# Patient Record
Sex: FEMALE | Race: WHITE | URBAN - NONMETROPOLITAN AREA
[De-identification: names, ages, dates, MRNs, and addresses within clinical notes are randomized per-mention and may not be internally consistent; named-entity substitution may affect disease eponyms.]

---

## 2023-01-10 ENCOUNTER — OFFICE VISIT (OUTPATIENT)
Dept: OBGYN CLINIC | Age: 68
End: 2023-01-10
Payer: MEDICARE

## 2023-01-10 VITALS
DIASTOLIC BLOOD PRESSURE: 88 MMHG | HEIGHT: 65 IN | WEIGHT: 140.2 LBS | BODY MASS INDEX: 23.36 KG/M2 | SYSTOLIC BLOOD PRESSURE: 130 MMHG

## 2023-01-10 DIAGNOSIS — Z78.0 ASYMPTOMATIC MENOPAUSAL STATE: ICD-10-CM

## 2023-01-10 DIAGNOSIS — Z01.419 VISIT FOR GYNECOLOGIC EXAMINATION: Primary | ICD-10-CM

## 2023-01-10 PROCEDURE — G0101 CA SCREEN;PELVIC/BREAST EXAM: HCPCS

## 2023-01-10 ASSESSMENT — ENCOUNTER SYMPTOMS
CONSTIPATION: 0
SHORTNESS OF BREATH: 0
ABDOMINAL PAIN: 0
DIARRHEA: 0

## 2023-01-10 NOTE — PROGRESS NOTES
YEARLY PHYSICAL    Date of service: 1/10/2023    Liv Bertrand  Is a 79 y.o.   female    PT's PCP is: Tristin Figueroa MD     : 1955                                         Chaperone for Intimate Exam  Chaperone was offered as part of the rooming process. Patient declined and agrees to continue with exam without a chaperone. Subjective:       No LMP recorded. Patient has had a hysterectomy. Are your menses regular: not applicable    OB History    Para Term  AB Living   2 2 1 1   1   SAB IAB Ectopic Molar Multiple Live Births             2      # Outcome Date GA Lbr Roman/2nd Weight Sex Delivery Anes PTL Lv   2  86 30w0d   F Vag-Spont   DEC   1 Term 82    M Vag-Spont   PRIMITIVO        Social History     Tobacco Use   Smoking Status Never   Smokeless Tobacco Never        Social History     Substance and Sexual Activity   Alcohol Use Not Currently    Comment: occasional wine       Family History   Problem Relation Age of Onset    Diabetes Maternal Grandmother     Stroke Father     Arthritis Father     Diabetes Father     Heart Attack Father     Rheum Arthritis Father     Breast Cancer Paternal Aunt        Any family history of breast or ovarian cancer: Yes    Any family history of blood clots: No      Allergies: Iodine      Current Outpatient Medications:     ONE TOUCH ULTRA TEST strip,   , Disp: , Rfl: 1    ibuprofen (ADVIL;MOTRIN) 800 MG tablet, Take 800 mg by mouth every 6 hours as needed for Pain., Disp: , Rfl:     Calcium Carb-Ergocalciferol (CHEWABLE CALCIUM/D PO), Take 1 tablet by mouth., Disp: , Rfl:     Omega-3 Fatty Acids (FISH OIL) 1200 MG CAPS, Take  by mouth., Disp: , Rfl:     Docosahexaenoic Acid 200 MG CAPS, Take  by mouth., Disp: , Rfl:     aspirin 81 MG chewable tablet, Take 81 mg by mouth daily. , Disp: , Rfl:     Loratadine-Pseudoephedrine (CLARITIN-D 12 HOUR PO), Take 1 tablet by mouth 2 times daily. Prn, Disp: , Rfl:     Social History     Substance and Sexual Activity   Sexual Activity Not Currently       Any bleeding or pain with intercourse: not sexually active    Last Yearly:  11/8/18    Last pap: not sure    Last HPV: not sure    Last Mammogram: 1/2023    Last Dexascan 2017    Last colorectal screen- type:colonoscopy*  date  2016    Do you do self breast exams: Yes    Past Medical History:   Diagnosis Date    Diverticulitis     Hyperlipidemia     Sarcoidosis of lymph nodes ([facility] [address] 01/01/1996    Stress incontinence 07/15/2015       Past Surgical History:   Procedure Laterality Date    BLADDER REPAIR      FINGER TRIGGER RELEASE Bilateral     HYSTERECTOMY (CERVIX STATUS UNKNOWN)         Family History   Problem Relation Age of Onset    Diabetes Maternal Grandmother     Stroke Father     Arthritis Father     Diabetes Father     Heart Attack Father     Rheum Arthritis Father     Breast Cancer Paternal Aunt        Chief Complaint   Patient presents with    Annual Exam     Here for annual exam. Last annual 11/8/18, history of hysterectomy. Would like discuss recent mammogram.          PE:  Vital Signs  Blood pressure 130/88, height 5' 5\" (1.651 m), weight 140 lb 3.2 oz (63.6 kg). Estimated body mass index is 23.33 kg/m² as calculated from the following:    Height as of this encounter: 5' 5\" (1.651 m). Weight as of this encounter: 140 lb 3.2 oz (63.6 kg). Labs:    No results found for this visit on 01/10/23. No data recorded    NURSE: YOANDY edge RN    HPI: Patient presents to establish care for annual visit. Denies breast concerns. Encouraged SBE. UTD on mammogram - reviewed normal results. Did have DEXA scan about 6 years ago - would like to repeat to make sure BMD remains normal.  She is currently taking vitamin D/calcium. Denies bowel/bladder concerns. Reports that vaginal repair that she had several years ago worked great for her. Denies abnormal discharge, pelvic pain.   Denies bothersome symptoms of menopause. She had TLH at ~37 years old. Review of Systems   Constitutional:  Negative for chills, fatigue and fever. Respiratory:  Negative for shortness of breath. Cardiovascular:  Negative for chest pain. Gastrointestinal:  Negative for abdominal pain, constipation and diarrhea. Genitourinary:  Negative for dyspareunia, dysuria, frequency, menstrual problem, pelvic pain, urgency, vaginal bleeding and vaginal discharge. Neurological:  Negative for dizziness, light-headedness and headaches. Physical Exam  Constitutional:       Appearance: Normal appearance. Genitourinary:      Vulva normal.      Right Labia: No rash, tenderness or lesions. Left Labia: No tenderness, lesions or rash. No vaginal discharge, tenderness or bleeding. Right Adnexa: not tender and not full. Left Adnexa: not tender and not full. Cervix is absent. Uterus is absent. Pelvic exam was performed with patient in the lithotomy position. Breasts:     Breasts are symmetrical.      Right: No inverted nipple, nipple discharge, skin change or tenderness. Left: No inverted nipple, nipple discharge, skin change or tenderness. HENT:      Head: Normocephalic and atraumatic. Mouth/Throat:      Mouth: Mucous membranes are moist.   Eyes:      Extraocular Movements: Extraocular movements intact. Cardiovascular:      Rate and Rhythm: Normal rate. Pulmonary:      Effort: Pulmonary effort is normal.   Abdominal:      General: There is no distension. Palpations: Abdomen is soft. Tenderness: There is no abdominal tenderness. Musculoskeletal:         General: Normal range of motion. Cervical back: Normal range of motion. Neurological:      General: No focal deficit present. Mental Status: She is alert and oriented to person, place, and time. Skin:     General: Skin is warm and dry.    Psychiatric:         Mood and Affect: Mood normal. Behavior: Behavior normal.         Thought Content: Thought content normal.         Judgment: Judgment normal.   Chaperone present: chaperone declined. Assessment and Plan          Diagnosis Orders   1. Visit for gynecologic examination        2. Asymptomatic menopausal state   DEXA Bone Density 2 Sites          Repeat Annual every 1 year  Cervical Cytology Evaluation begins at 24years old. If Negative Cytology, Follow-up screening per current guidelines. Mammograms every 1year. If 35 yo and last mammogram was negative. Routine healthmaintenance per patients PCP. I am having Nicole Velazquez maintain her Calcium Carb-Ergocalciferol (CHEWABLE CALCIUM/D PO), Fish Oil, Docosahexaenoic Acid, aspirin, Loratadine-Pseudoephedrine (CLARITIN-D 12 HOUR PO), ibuprofen, and ONE TOUCH ULTRA TEST. Return in about 2 years (around 1/10/2025) for annual.    She was also counseled on her preventative health maintenance recommendations and follow-up. There are no Patient Instructions on file for this visit.     Jordon Rivas PA-C,1/10/2023 10:52 AM

## 2023-01-30 DIAGNOSIS — Z78.0 ASYMPTOMATIC MENOPAUSAL STATE: ICD-10-CM

## 2024-07-11 ENCOUNTER — HOSPITAL ENCOUNTER (OUTPATIENT)
Age: 69
Setting detail: SPECIMEN
Discharge: HOME | End: 2024-07-11

## 2024-07-11 ENCOUNTER — OFFICE VISIT (OUTPATIENT)
Dept: OBGYN CLINIC | Age: 69
End: 2024-07-11

## 2024-07-11 VITALS
WEIGHT: 133 LBS | SYSTOLIC BLOOD PRESSURE: 128 MMHG | DIASTOLIC BLOOD PRESSURE: 84 MMHG | HEIGHT: 64 IN | BODY MASS INDEX: 22.71 KG/M2

## 2024-07-11 DIAGNOSIS — R35.0 URINARY FREQUENCY: Primary | ICD-10-CM

## 2024-07-11 DIAGNOSIS — R35.0 URINARY FREQUENCY: ICD-10-CM

## 2024-07-11 DIAGNOSIS — R30.0 DYSURIA: ICD-10-CM

## 2024-07-11 DIAGNOSIS — N89.8 VAGINAL ITCHING: ICD-10-CM

## 2024-07-11 LAB
BILIRUBIN, POC: NEGATIVE
BLOOD URINE, POC: NEGATIVE
CANDIDA SPECIES: NEGATIVE
CLARITY, POC: NORMAL
COLOR, POC: NORMAL
GARDNERELLA VAGINALIS: NEGATIVE
GLUCOSE URINE, POC: NEGATIVE
KETONES, POC: NEGATIVE
LEUKOCYTE EST, POC: NORMAL
NITRITE, POC: POSITIVE
PH, POC: 6.5
PROTEIN, POC: NEGATIVE
SOURCE: NORMAL
SPECIFIC GRAVITY, POC: 1.03
TRICHOMONAS: NEGATIVE
UROBILINOGEN, POC: 0.2

## 2024-07-11 RX ORDER — NITROFURANTOIN 25; 75 MG/1; MG/1
100 CAPSULE ORAL 2 TIMES DAILY
Qty: 10 CAPSULE | Refills: 0 | Status: SHIPPED | OUTPATIENT
Start: 2024-07-11 | End: 2024-07-16

## 2024-07-11 RX ORDER — NYSTATIN AND TRIAMCINOLONE ACETONIDE 100000; 1 [USP'U]/G; MG/G
OINTMENT TOPICAL
COMMUNITY
Start: 2023-11-24

## 2024-07-14 LAB
MICROORGANISM SPEC CULT: ABNORMAL
SERVICE CMNT-IMP: ABNORMAL
SPECIMEN DESCRIPTION: ABNORMAL

## 2024-07-17 ENCOUNTER — TELEPHONE (OUTPATIENT)
Dept: OBGYN CLINIC | Age: 69
End: 2024-07-17

## 2024-07-17 NOTE — TELEPHONE ENCOUNTER
Pt called requesting lab results. Pt states that she finished her UTI medication and is a little better. Pt still c/o frequency. Pt is wondering what she can do to make this go away.

## 2024-07-19 NOTE — PROGRESS NOTES
DATE OF VISIT:  7/11/24    PATIENT NAME:  Gloria Brody     YOB: 1955    REASON FOR VISIT:    Chief Complaint   Patient presents with    Urinary Frequency    Dysuria     Patient reports urinary symptoms x10 days. She has been using a urinary tract probiotic.    Vaginal Itching     Patient started noticing vaginal itching about 7 days ago.  She tried monistat 7 day treatment and got a little bit of improvement.          HISTORY OF PRESENT ILLNESS:  Patient presents with concern for infection.  She reports dysuria and urinary frequency x 10 d.  Has tried probiotic with no relief.  UA in clinic pos for small leuks and pos nitrites.  Agreeable to urine cx.  She does also note vaginal itching x 7 d, unsure if burning sensation could be related.  She did try Monistat 7 d tx which did help improve symptoms.  Agreeable to vaginal cx today.         No LMP recorded. Patient has had a hysterectomy.  Vitals:    07/11/24 1043   BP: 128/84   Site: Left Upper Arm   Position: Sitting   Weight: 60.3 kg (133 lb)   Height: 1.619 m (5' 3.75\")     Body mass index is 23.01 kg/m².  Allergies   Allergen Reactions    Iodine Anaphylaxis     Current Outpatient Medications   Medication Sig Dispense Refill    nystatin-triamcinolone (MYCOLOG) 522962-0.1 UNIT/GM-% ointment 1 application to affected area Externally Twice a day for 15 days      ibuprofen (ADVIL;MOTRIN) 800 MG tablet Take 1 tablet by mouth every 6 hours as needed for Pain      Calcium Carb-Ergocalciferol (CHEWABLE CALCIUM/D PO) Take 1 tablet by mouth.      Omega-3 Fatty Acids (FISH OIL) 1200 MG CAPS Take  by mouth.      aspirin 81 MG chewable tablet Take 1 tablet by mouth daily      Loratadine-Pseudoephedrine (CLARITIN-D 12 HOUR PO) Take 1 tablet by mouth 2 times daily. Prn      ONE TOUCH ULTRA TEST strip   1     No current facility-administered medications for this visit.     Social History     Socioeconomic History    Marital status: Single   Tobacco Use

## 2025-01-15 ENCOUNTER — TELEPHONE (OUTPATIENT)
Dept: OBGYN CLINIC | Age: 70
End: 2025-01-15

## 2025-01-15 NOTE — TELEPHONE ENCOUNTER
Patient called to schedule her annual but would like to have a mammogram done first. She does not have a order for the [facility] and would like to have it faxed to [facility].

## 2025-01-16 DIAGNOSIS — Z12.31 ENCOUNTER FOR SCREENING MAMMOGRAM FOR MALIGNANT NEOPLASM OF BREAST: Primary | ICD-10-CM

## 2025-02-27 ENCOUNTER — OFFICE VISIT (OUTPATIENT)
Dept: OBGYN CLINIC | Age: 70
End: 2025-02-27

## 2025-02-27 VITALS — WEIGHT: 141.6 LBS | DIASTOLIC BLOOD PRESSURE: 86 MMHG | SYSTOLIC BLOOD PRESSURE: 138 MMHG | BODY MASS INDEX: 24.5 KG/M2

## 2025-02-27 DIAGNOSIS — Z01.419 VISIT FOR GYNECOLOGIC EXAMINATION: Primary | ICD-10-CM

## 2025-02-27 RX ORDER — ERGOCALCIFEROL 1.25 MG/1
50000 CAPSULE, LIQUID FILLED ORAL DAILY
COMMUNITY

## 2025-02-27 SDOH — ECONOMIC STABILITY: FOOD INSECURITY: WITHIN THE PAST 12 MONTHS, YOU WORRIED THAT YOUR FOOD WOULD RUN OUT BEFORE YOU GOT MONEY TO BUY MORE.: NEVER TRUE

## 2025-02-27 SDOH — ECONOMIC STABILITY: FOOD INSECURITY: WITHIN THE PAST 12 MONTHS, THE FOOD YOU BOUGHT JUST DIDN'T LAST AND YOU DIDN'T HAVE MONEY TO GET MORE.: NEVER TRUE

## 2025-02-27 NOTE — PROGRESS NOTES
YEARLY PHYSICAL    Date of service: 2025    Gloria Brody  Is a 69 y.o. female    PT's PCP is: No primary care provider on file.     : 1955                                         Chaperone for Intimate Exam  Chaperone was offered as part of the rooming process. Patient declined and agrees to continue with exam without a chaperone.  Chaperone: N/A      Subjective:       No LMP recorded. Patient has had a hysterectomy.     Are your menses regular: not applicable    OB History    Para Term  AB Living   2 2 1 1   1   SAB IAB Ectopic Molar Multiple Live Births             2      # Outcome Date GA Lbr Roman/2nd Weight Sex Type Anes PTL Lv   2  86 30w0d   F Vag-Spont   DEC   1 Term 82    M Vag-Spont   PRIMITIVO        Social History     Tobacco Use   Smoking Status Never   Smokeless Tobacco Never        Social History     Substance and Sexual Activity   Alcohol Use Not Currently    Comment: occasional wine       Family History   Problem Relation Age of Onset    Stroke Father     Arthritis Father     Diabetes Father     Heart Attack Father     Rheum Arthritis Father     Diabetes Maternal Grandmother     Breast Cancer Paternal Aunt     Breast Cancer Paternal Aunt        Any family history of breast or ovarian cancer: Yes    Any family history of blood clots: No      Allergies: Iodine      Current Outpatient Medications:     Melatonin 1 MG CAPS, Take by mouth, Disp: , Rfl:     vitamin D (ERGOCALCIFEROL) 1.25 MG (07912 UT) CAPS capsule, Take 1 capsule by mouth daily, Disp: , Rfl:     nystatin-triamcinolone (MYCOLOG) 145367-1.1 UNIT/GM-% ointment, 1 application to affected area Externally Twice a day for 15 days, Disp: , Rfl:     ONE TOUCH ULTRA TEST strip,   , Disp: , Rfl: 1    ibuprofen (ADVIL;MOTRIN) 800 MG tablet, Take 1 tablet by mouth every 6 hours as needed for Pain, Disp: , Rfl:     Calcium Carb-Ergocalciferol